# Patient Record
Sex: FEMALE | Race: WHITE | NOT HISPANIC OR LATINO | Employment: FULL TIME | URBAN - METROPOLITAN AREA
[De-identification: names, ages, dates, MRNs, and addresses within clinical notes are randomized per-mention and may not be internally consistent; named-entity substitution may affect disease eponyms.]

---
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
## 2021-05-26 ENCOUNTER — HISTORICAL (OUTPATIENT)
Dept: RADIOLOGY | Facility: HOSPITAL | Age: 21
End: 2021-05-26

## 2021-06-16 ENCOUNTER — HISTORICAL (OUTPATIENT)
Dept: ADMINISTRATIVE | Facility: HOSPITAL | Age: 21
End: 2021-06-16

## 2022-01-31 ENCOUNTER — HISTORICAL (OUTPATIENT)
Dept: ADMINISTRATIVE | Facility: HOSPITAL | Age: 22
End: 2022-01-31

## 2022-02-22 ENCOUNTER — HISTORICAL (OUTPATIENT)
Dept: ADMINISTRATIVE | Facility: HOSPITAL | Age: 22
End: 2022-02-22

## 2022-03-10 LAB
BASOPHILS # BLD AUTO: 0.04 10*3/UL (ref 0.01–0.08)
BASOPHILS NFR BLD AUTO: 0.3 % (ref 0.1–1.2)
EOSINOPHIL # BLD AUTO: 0.03 10*3/UL (ref 0.04–0.36)
EOSINOPHIL NFR BLD AUTO: 0.3 % (ref 0.7–7)
ERYTHROCYTE [DISTWIDTH] IN BLOOD BY AUTOMATED COUNT: 12.7 % (ref 11–14.5)
HCT VFR BLD AUTO: 33.6 % (ref 36–48)
HGB BLD-MCNC: 11.6 G/DL (ref 11.8–16)
IMM GRANULOCYTES # BLD AUTO: 0.16 10*3/UL (ref 0–0.03)
IMM GRANULOCYTES NFR BLD AUTO: 1.4 % (ref 0–0.5)
LYMPHOCYTES # BLD AUTO: 1.19 10*3/UL (ref 1.16–3.74)
LYMPHOCYTES NFR BLD AUTO: 10.2 % (ref 20–55)
MANUAL DIFF? (OHS): NORMAL
MCH RBC QN AUTO: 29.8 PG (ref 27–34)
MCHC RBC AUTO-ENTMCNC: 34.5 G/DL (ref 31–37)
MCV RBC AUTO: 86.4 FL (ref 79–99)
MONOCYTES # BLD AUTO: 0.56 10*3/UL (ref 0.24–0.36)
MONOCYTES NFR BLD AUTO: 4.8 % (ref 4.7–12.5)
NEUTROPHILS # BLD AUTO: 9.71 10*3/UL (ref 1.56–6.13)
NEUTROPHILS NFR BLD AUTO: 83 % (ref 37–73)
PLATELET # BLD AUTO: 229 10*3/UL (ref 140–371)
PMV BLD AUTO: 10 FL (ref 9.4–12.4)
RBC # BLD AUTO: 3.89 10*6/UL (ref 4–5.1)
WBC # SPEC AUTO: 11.7 10*3/UL (ref 4–11.5)

## 2022-04-07 ENCOUNTER — HISTORICAL (OUTPATIENT)
Dept: ADMINISTRATIVE | Facility: HOSPITAL | Age: 22
End: 2022-04-07
Payer: MEDICAID

## 2022-04-23 VITALS
HEIGHT: 64 IN | SYSTOLIC BLOOD PRESSURE: 130 MMHG | DIASTOLIC BLOOD PRESSURE: 86 MMHG | BODY MASS INDEX: 25.97 KG/M2 | WEIGHT: 152.13 LBS

## 2022-05-15 ENCOUNTER — HISTORICAL (OUTPATIENT)
Dept: ADMINISTRATIVE | Facility: HOSPITAL | Age: 22
End: 2022-05-15
Payer: MEDICAID

## 2023-05-11 ENCOUNTER — HOSPITAL ENCOUNTER (EMERGENCY)
Facility: HOSPITAL | Age: 23
Discharge: HOME | End: 2023-05-11
Attending: EMERGENCY MEDICINE
Payer: MEDICAID

## 2023-05-11 VITALS
RESPIRATION RATE: 18 BRPM | HEART RATE: 90 BPM | BODY MASS INDEX: 21.62 KG/M2 | OXYGEN SATURATION: 100 % | SYSTOLIC BLOOD PRESSURE: 137 MMHG | HEIGHT: 64 IN | TEMPERATURE: 98 F | WEIGHT: 126.63 LBS | DIASTOLIC BLOOD PRESSURE: 56 MMHG

## 2023-05-11 DIAGNOSIS — N61.0 MASTITIS: Primary | ICD-10-CM

## 2023-05-11 PROCEDURE — 99283 EMERGENCY DEPT VISIT LOW MDM: CPT

## 2023-05-11 RX ORDER — AMOXICILLIN AND CLAVULANATE POTASSIUM 875; 125 MG/1; MG/1
1 TABLET, FILM COATED ORAL 2 TIMES DAILY
Qty: 28 TABLET | Refills: 0 | Status: SHIPPED | OUTPATIENT
Start: 2023-05-11 | End: 2023-05-25

## 2023-05-11 RX ORDER — AMOXICILLIN AND CLAVULANATE POTASSIUM 875; 125 MG/1; MG/1
1 TABLET, FILM COATED ORAL 2 TIMES DAILY
Qty: 28 TABLET | Refills: 0 | Status: SHIPPED | OUTPATIENT
Start: 2023-05-11 | End: 2023-05-11 | Stop reason: SDUPTHER

## 2023-05-11 NOTE — ED PROVIDER NOTES
Encounter Date: 5/11/2023       History     Chief Complaint   Patient presents with    Breast Pain     C/o left breast redness and pain. Reports breastfeeding     See MDM    The history is provided by the patient. No  was used.   Review of patient's allergies indicates:  No Known Allergies  History reviewed. No pertinent past medical history.  Past Surgical History:   Procedure Laterality Date    CHOLECYSTECTOMY      EYE SURGERY       History reviewed. No pertinent family history.  Social History     Tobacco Use    Smoking status: Never    Smokeless tobacco: Never   Substance Use Topics    Alcohol use: Not Currently    Drug use: Not Currently     Review of Systems   Constitutional:  Negative for fever.   Respiratory:  Negative for cough and shortness of breath.    Cardiovascular:  Negative for chest pain.   Gastrointestinal:  Negative for abdominal pain.   Genitourinary:  Negative for difficulty urinating and dysuria.   Musculoskeletal:  Negative for gait problem.   Skin:  Negative for color change.   Neurological:  Negative for dizziness, speech difficulty and headaches.   Psychiatric/Behavioral:  Negative for hallucinations and suicidal ideas.    All other systems reviewed and are negative.    Physical Exam     Initial Vitals [05/11/23 1723]   BP Pulse Resp Temp SpO2   (!) 137/56 90 18 98.2 °F (36.8 °C) 100 %      MAP       --         Physical Exam    Nursing note and vitals reviewed.  Constitutional: She appears well-developed and well-nourished.   HENT:   Head: Normocephalic.   Eyes: EOM are normal.   Neck:   Normal range of motion.  Cardiovascular:  Normal rate, regular rhythm, normal heart sounds and intact distal pulses.           Pulmonary/Chest: Breath sounds normal. No respiratory distress.   Chaperone Pat:  mastitis left breast   Abdominal: Abdomen is soft. Bowel sounds are normal. There is no abdominal tenderness.   Musculoskeletal:         General: Normal range of motion.       Cervical back: Normal range of motion.     Neurological: She is alert and oriented to person, place, and time. She has normal strength.   Skin: Skin is warm and dry.   Psychiatric: She has a normal mood and affect. Her behavior is normal. Judgment and thought content normal.       ED Course   Procedures  Labs Reviewed - No data to display       Imaging Results    None          Medications - No data to display  Medical Decision Making:   Initial Assessment:   Historian:   Patient.  Patient is a 23-year-old female  that presents with redness to left breast that has been present few days. Associated symptoms nothing. Surrounding information is currently breast feeds at times. Exacerbated by nothing. Relieved by nothing. Patient treatment prior to arrival none. Risk factors include nothing. Other history pertaining to this complaint none.   Assessment:  See physical exam.    Differential Diagnosis:    Breast abscess, breast cellulitis, mastitis  ED Management:   History was obtained.  Physical performed.  Examination of breasts shows mastitis.  I will put patient Augmentin for 14 days.  No medical or surgical consult indicated ER.  No social determinants that affect her care were noted.                        Clinical Impression:   Final diagnoses:  [N61.0] Mastitis - left breast (Primary)        ED Disposition Condition    Discharge Stable          ED Prescriptions       Medication Sig Dispense Start Date End Date Auth. Provider    amoxicillin-clavulanate 875-125mg (AUGMENTIN) 875-125 mg per tablet Take 1 tablet by mouth 2 (two) times daily. for 14 days 28 tablet 5/11/2023 5/25/2023 JA Avendaño          Follow-up Information       Follow up With Specialties Details Why Contact Info    Your Primary Care Provider  Call in 3 days ed follow up              JA Avendaño  05/11/23 3751

## 2023-08-02 NOTE — PROGRESS NOTES
Chief Complaint: Annual exam    Chief Complaint   Patient presents with    Well Woman       HPI:   Marivel Knight is a 23 y.o. year old  here for her Annual Exam. Currently still breastfeeding. Doing well, denies complaints.     GYN History:  LMP: none  Frequency: n/a   Cycle Length: n/a days   Flow: n/a  Intermenstrual Bleeding: No  Postcoital Bleeding: No  Dysmenorrhea: No  Sexually Active: yes   Dyspareunia: No  Contraception: none   H/o STI: TV  Last pap:   H/o abnl pap prior to current: Yes   Colposcopy: no  Gardasil: 2/2   MMG: none  H/o abnl MMG: none  Colonoscopy: n/a        History reviewed. No pertinent past medical history.  Past Surgical History:   Procedure Laterality Date    CHOLECYSTECTOMY      EYE SURGERY       No current outpatient medications on file.  Review of patient's allergies indicates:  No Known Allergies  OB History    Para Term  AB Living   1 1 1     1   SAB IAB Ectopic Multiple Live Births           1      # Outcome Date GA Lbr Warren/2nd Weight Sex Delivery Anes PTL Lv   1 Term 23 38w1d  2.863 kg (6 lb 5 oz)  Vag-Spont EPI  HAKEEM     Social History     Tobacco Use    Smoking status: Never    Smokeless tobacco: Never   Substance Use Topics    Alcohol use: Not Currently    Drug use: Not Currently     History reviewed. No pertinent family history.    Review of Systems:   Review of Systems   Constitutional:  Negative for appetite change, chills, fatigue, fever and unexpected weight change.   Eyes:  Negative for visual disturbance.   Respiratory:  Negative for cough, shortness of breath and wheezing.    Cardiovascular:  Negative for chest pain, palpitations and leg swelling.   Gastrointestinal:  Negative for abdominal pain, bloating, blood in stool, constipation, diarrhea, nausea, vomiting, reflux and fecal incontinence.   Endocrine: Negative for hair loss and hot flashes.   Genitourinary:  Negative for bladder incontinence, decreased libido, dysmenorrhea,  "dyspareunia, dysuria, flank pain, frequency, genital sores, hematuria, hot flashes, menorrhagia, menstrual problem, pelvic pain, urgency, vaginal bleeding, vaginal discharge, vaginal pain, urinary incontinence, postcoital bleeding, postmenopausal bleeding, vaginal dryness and vaginal odor.   Integumentary:  Negative for rash, acne, hair changes, breast mass, nipple discharge, breast skin changes and breast tenderness.   Neurological:  Negative for headaches.   Psychiatric/Behavioral:  Negative for depression.    Breast: Negative for asymmetry, breast self exam, lump, mass, mastodynia, nipple discharge, skin changes and tenderness       Physical Exam:  /60 (BP Location: Left arm, Patient Position: Sitting)   Ht 5' 4" (1.626 m)   Wt 56.7 kg (125 lb)   BMI 21.46 kg/m²       Physical Exam:   Constitutional: She is oriented to person, place, and time. She appears well-developed and well-nourished.    HENT:   Head: Normocephalic.      Cardiovascular:       Exam reveals no edema.        Pulmonary/Chest: Effort normal. She exhibits no mass, no tenderness, no bony tenderness, no deformity and no retraction. Right breast exhibits no inverted nipple, no mass, no nipple discharge, no skin change, no tenderness, no bleeding, no swelling, no mastectomy, no augmentation and no lumpectomy. Left breast exhibits no inverted nipple, no mass, no nipple discharge, no skin change, no tenderness, no bleeding, no swelling, no mastectomy, no augmentation and no lumpectomy. Breasts are symmetrical.        Abdominal: Soft. She exhibits no distension and no mass. There is no abdominal tenderness. There is no rebound and no guarding. No hernia. Hernia confirmed negative in the right inguinal area.     Genitourinary:    Inguinal canal, vagina, uterus, right adnexa, left adnexa and rectum normal.   Rectum:      No anal fissure or external hemorrhoid.   The external female genitalia was normal.   No external genitalia lesions " identified,Genitalia hair distrobution normal .   Labial bartholins normal.There is no rash, tenderness, lesion or injury on the right labia. There is no rash, tenderness, lesion or injury on the left labia. Cervix is normal. No no masses or organomegaly. Right adnexum displays no mass, no tenderness and no fullness. Left adnexum displays no mass, no tenderness and no fullness. Vagina exhibits no lesion. No erythema,  no vaginal discharge, tenderness, bleeding, rectocele, cystocele or unspecified prolapse of vaginal walls in the vagina.    No foreign body in the vagina.      No signs of injury in the vagina.   Vagina was moist.Cervix exhibits no motion tenderness, no lesion, no discharge, no friability, no lesion, no tenderness and no polyp. Uterus consistancy normal. and Uerus contour normal  Uterus is not deviated, not enlarged, not fixed, not tender, not hosting fibroids and no uterine prolapse. Normal urethral meatus.Urethral Meatus exhibits: urethral lesion and prolapsedUrethra findings: no urethral mass and no tendernessBladder findings: no bladder tenderness          Musculoskeletal: Normal range of motion.      Lymphadenopathy: No inguinal adenopathy noted on the right or left side.    Neurological: She is alert and oriented to person, place, and time.    Skin: Skin is warm and dry.    Psychiatric: She has a normal mood and affect. Her behavior is normal. Judgment and thought content normal.        Assessment:   Annual Well Women Exam  There are no diagnoses linked to this encounter.      Plan:  Pap Done GC CZ TV   Breast Self-awareness  Recommend exercise at least 3 times weekly  Healthy, balanced diet  Keep yearly follow up with PCP  Follow up for PRN/ANNUAL .     Counseling:    A brief discussion of contraceptive choices and STD prevention was had.    Avoidance of cigarette smoking, alcohol use, and drug use was encouraged.    A healthy diet and regular exercise was stressed.    All questions were  answered and the patient voiced understanding of the above issues.

## 2023-08-04 ENCOUNTER — OFFICE VISIT (OUTPATIENT)
Dept: OBSTETRICS AND GYNECOLOGY | Facility: CLINIC | Age: 23
End: 2023-08-04
Payer: MEDICAID

## 2023-08-04 VITALS
BODY MASS INDEX: 21.34 KG/M2 | HEIGHT: 64 IN | WEIGHT: 125 LBS | SYSTOLIC BLOOD PRESSURE: 120 MMHG | DIASTOLIC BLOOD PRESSURE: 60 MMHG

## 2023-08-04 DIAGNOSIS — Z01.419 WELL WOMAN EXAM WITH ROUTINE GYNECOLOGICAL EXAM: Primary | ICD-10-CM

## 2023-08-04 DIAGNOSIS — Z12.4 SCREENING FOR MALIGNANT NEOPLASM OF THE CERVIX: ICD-10-CM

## 2023-08-04 PROCEDURE — 1159F PR MEDICATION LIST DOCUMENTED IN MEDICAL RECORD: ICD-10-PCS | Mod: CPTII,,, | Performed by: OBSTETRICS & GYNECOLOGY

## 2023-08-04 PROCEDURE — 87491 CHLMYD TRACH DNA AMP PROBE: CPT

## 2023-08-04 PROCEDURE — 88174 CYTOPATH C/V AUTO IN FLUID: CPT | Performed by: OBSTETRICS & GYNECOLOGY

## 2023-08-04 PROCEDURE — 3074F PR MOST RECENT SYSTOLIC BLOOD PRESSURE < 130 MM HG: ICD-10-PCS | Mod: CPTII,,, | Performed by: OBSTETRICS & GYNECOLOGY

## 2023-08-04 PROCEDURE — 3078F DIAST BP <80 MM HG: CPT | Mod: CPTII,,, | Performed by: OBSTETRICS & GYNECOLOGY

## 2023-08-04 PROCEDURE — 3074F SYST BP LT 130 MM HG: CPT | Mod: CPTII,,, | Performed by: OBSTETRICS & GYNECOLOGY

## 2023-08-04 PROCEDURE — 3008F PR BODY MASS INDEX (BMI) DOCUMENTED: ICD-10-PCS | Mod: CPTII,,, | Performed by: OBSTETRICS & GYNECOLOGY

## 2023-08-04 PROCEDURE — 99395 PREV VISIT EST AGE 18-39: CPT | Mod: ,,, | Performed by: OBSTETRICS & GYNECOLOGY

## 2023-08-04 PROCEDURE — 1159F MED LIST DOCD IN RCRD: CPT | Mod: CPTII,,, | Performed by: OBSTETRICS & GYNECOLOGY

## 2023-08-04 PROCEDURE — 99395 PR PREVENTIVE VISIT,EST,18-39: ICD-10-PCS | Mod: ,,, | Performed by: OBSTETRICS & GYNECOLOGY

## 2023-08-04 PROCEDURE — 87591 N.GONORRHOEAE DNA AMP PROB: CPT

## 2023-08-04 PROCEDURE — 87661 TRICHOMONAS VAGINALIS AMPLIF: CPT

## 2023-08-04 PROCEDURE — 3078F PR MOST RECENT DIASTOLIC BLOOD PRESSURE < 80 MM HG: ICD-10-PCS | Mod: CPTII,,, | Performed by: OBSTETRICS & GYNECOLOGY

## 2023-08-04 PROCEDURE — 3008F BODY MASS INDEX DOCD: CPT | Mod: CPTII,,, | Performed by: OBSTETRICS & GYNECOLOGY

## 2023-08-09 LAB — PSYCHE PATHOLOGY RESULT: NORMAL

## 2024-03-19 DIAGNOSIS — O20.9 BLEEDING IN EARLY PREGNANCY: Primary | ICD-10-CM

## 2024-03-19 LAB — B-HCG SERPL-ACNC: 36 MIU/ML

## 2024-03-21 ENCOUNTER — TELEPHONE (OUTPATIENT)
Dept: OBSTETRICS AND GYNECOLOGY | Facility: CLINIC | Age: 24
End: 2024-03-21
Payer: COMMERCIAL

## 2024-03-21 NOTE — TELEPHONE ENCOUNTER
Spoke with pt and she is miscarriage and she is feeling ok, pt will come see him in 2 weeks to follow up        ----- Message from Ruben Olmstead sent at 3/21/2024  8:19 AM CDT -----  Contact: self  Patient Marivel Knight is requesting a call back regarding miscarriage she had - has a few questions. Please call back at [phone].

## 2024-09-24 ENCOUNTER — PATIENT MESSAGE (OUTPATIENT)
Dept: PRIMARY CARE CLINIC | Facility: CLINIC | Age: 24
End: 2024-09-24
Payer: COMMERCIAL

## 2024-10-08 DIAGNOSIS — Z34.91 ENCOUNTER FOR PREGNANCY RELATED EXAMINATION IN FIRST TRIMESTER: Primary | ICD-10-CM

## 2024-10-15 ENCOUNTER — PROCEDURE VISIT (OUTPATIENT)
Dept: OBSTETRICS AND GYNECOLOGY | Facility: CLINIC | Age: 24
End: 2024-10-15
Payer: COMMERCIAL

## 2024-10-15 DIAGNOSIS — Z34.91 ENCOUNTER FOR PREGNANCY RELATED EXAMINATION IN FIRST TRIMESTER: ICD-10-CM

## 2024-10-15 PROCEDURE — 76801 OB US < 14 WKS SINGLE FETUS: CPT | Mod: 26,S$PBB,, | Performed by: OBSTETRICS & GYNECOLOGY

## 2024-10-31 ENCOUNTER — INITIAL PRENATAL (OUTPATIENT)
Dept: OBSTETRICS AND GYNECOLOGY | Facility: CLINIC | Age: 24
End: 2024-10-31
Payer: COMMERCIAL

## 2024-10-31 VITALS
WEIGHT: 140 LBS | DIASTOLIC BLOOD PRESSURE: 73 MMHG | SYSTOLIC BLOOD PRESSURE: 122 MMHG | HEART RATE: 84 BPM | BODY MASS INDEX: 24.03 KG/M2

## 2024-10-31 DIAGNOSIS — Z34.82 ENCOUNTER FOR SUPERVISION OF NORMAL PREGNANCY IN MULTIGRAVIDA IN SECOND TRIMESTER: Primary | ICD-10-CM

## 2024-10-31 PROCEDURE — 0500F INITIAL PRENATAL CARE VISIT: CPT | Mod: CPTII,,, | Performed by: OBSTETRICS & GYNECOLOGY

## 2024-11-01 ENCOUNTER — PATIENT MESSAGE (OUTPATIENT)
Dept: OBSTETRICS AND GYNECOLOGY | Facility: CLINIC | Age: 24
End: 2024-11-01
Payer: COMMERCIAL

## 2024-11-01 LAB
CHLAMYDIA: POSITIVE
GONORRHEA: NEGATIVE
SOURCE: ABNORMAL
SOURCE: NORMAL
TRICHOMONAS AMPLIFIED: NEGATIVE

## 2024-11-04 ENCOUNTER — PATIENT MESSAGE (OUTPATIENT)
Dept: OBSTETRICS AND GYNECOLOGY | Facility: CLINIC | Age: 24
End: 2024-11-04
Payer: COMMERCIAL

## 2024-11-04 DIAGNOSIS — A74.9 CHLAMYDIA INFECTION: Primary | ICD-10-CM

## 2024-11-04 RX ORDER — AZITHROMYCIN 500 MG/1
1000 TABLET, FILM COATED ORAL DAILY
Qty: 2 TABLET | Refills: 0 | Status: SHIPPED | OUTPATIENT
Start: 2024-11-04 | End: 2024-11-05

## 2024-12-05 ENCOUNTER — ROUTINE PRENATAL (OUTPATIENT)
Dept: OBSTETRICS AND GYNECOLOGY | Facility: CLINIC | Age: 24
End: 2024-12-05
Payer: COMMERCIAL

## 2024-12-05 VITALS — HEART RATE: 108 BPM | WEIGHT: 146 LBS | BODY MASS INDEX: 25.06 KG/M2

## 2024-12-05 DIAGNOSIS — Z34.82 ENCOUNTER FOR SUPERVISION OF NORMAL PREGNANCY IN MULTIGRAVIDA IN SECOND TRIMESTER: Primary | ICD-10-CM

## 2024-12-05 DIAGNOSIS — A74.9 CHLAMYDIA INFECTION: ICD-10-CM

## 2024-12-05 PROCEDURE — 0502F SUBSEQUENT PRENATAL CARE: CPT | Mod: CPTII,,, | Performed by: OBSTETRICS & GYNECOLOGY

## 2024-12-05 NOTE — PROGRESS NOTES
Subjective:       Patient ID: Marivel Knight is a 24 y.o.  at 15w0d      Chief Complaint:  Routine Prenatal Visit      History of Present Illness  No complaints. Reports normal sx. Labs and history reviewed with pt.       Review of Systems  Denies n/v, f/c, dysuria, contractions,   VD, VB, round ligament pain, headaches       Objective:     Vitals:    24 0737   Pulse: 108     Wt Readings from Last 3 Encounters:   24 66.2 kg (146 lb)   10/31/24 63.5 kg (140 lb)   23 56.7 kg (125 lb)        nad  NCAT  pupils normal size  Skin nml no rashes or lesions  No resp distress, resp even and unlabored  Gravid nt, no rebound no guarding  No cyanosis or clubbing, edema appropriate for pregn    FHT: 150's  Subjective:       Patient ID: Marivel Knight is a 24 y.o.  at 15w0d      Chief Complaint:  Routine Prenatal Visit      History of Present Illness  No complaints. Reports normal sx. Labs and history reviewed with pt.       Review of Systems  Denies n/v, f/c, dysuria, contractions,   VD, VB, round ligament pain, headaches       Objective:     Vitals:    24 0737   Pulse: 108     Wt Readings from Last 3 Encounters:   24 66.2 kg (146 lb)   10/31/24 63.5 kg (140 lb)   23 56.7 kg (125 lb)        nad  NCAT  pupils normal size  Skin nml no rashes or lesions  No resp distress, resp even and unlabored  Gravid nt, no rebound no guarding  No cyanosis or clubbing, edema appropriate for pregn    FHT: 150's       Assessment:        1. Encounter for supervision of normal pregnancy in multigravida in second trimester    2. Chlamydia infection                Plan:     Encouraged PNV  Pain, fever, bleeding precautions   RTC 4 weeks             Assessment:        1. Encounter for supervision of normal pregnancy in multigravida in second trimester    2. Chlamydia infection                Plan:     Encouraged PNV  Pain, fever, bleeding precautions   RTC 4 weeks

## 2024-12-06 LAB
CHLAMYDIA: NEGATIVE
GONORRHEA: NEGATIVE
SOURCE: NORMAL
SOURCE: NORMAL
TRICHOMONAS AMPLIFIED: NEGATIVE

## 2024-12-12 ENCOUNTER — PATIENT MESSAGE (OUTPATIENT)
Dept: OTHER | Facility: OTHER | Age: 24
End: 2024-12-12
Payer: COMMERCIAL

## 2024-12-19 ENCOUNTER — PATIENT MESSAGE (OUTPATIENT)
Dept: OTHER | Facility: OTHER | Age: 24
End: 2024-12-19
Payer: COMMERCIAL

## 2024-12-30 DIAGNOSIS — Z34.91 ENCOUNTER FOR PREGNANCY RELATED EXAMINATION IN FIRST TRIMESTER: Primary | ICD-10-CM

## 2024-12-30 DIAGNOSIS — Z34.92 ENCOUNTER FOR PREGNANCY RELATED EXAMINATION IN SECOND TRIMESTER: ICD-10-CM

## 2025-01-07 ENCOUNTER — ROUTINE PRENATAL (OUTPATIENT)
Dept: OBSTETRICS AND GYNECOLOGY | Facility: CLINIC | Age: 25
End: 2025-01-07
Payer: COMMERCIAL

## 2025-01-07 ENCOUNTER — PROCEDURE VISIT (OUTPATIENT)
Dept: OBSTETRICS AND GYNECOLOGY | Facility: CLINIC | Age: 25
End: 2025-01-07
Payer: COMMERCIAL

## 2025-01-07 VITALS
SYSTOLIC BLOOD PRESSURE: 131 MMHG | HEART RATE: 91 BPM | DIASTOLIC BLOOD PRESSURE: 84 MMHG | BODY MASS INDEX: 26.09 KG/M2 | WEIGHT: 152 LBS

## 2025-01-07 DIAGNOSIS — Z34.92 ENCOUNTER FOR PREGNANCY RELATED EXAMINATION IN SECOND TRIMESTER: ICD-10-CM

## 2025-01-07 DIAGNOSIS — Z34.82 ENCOUNTER FOR SUPERVISION OF NORMAL PREGNANCY IN MULTIGRAVIDA IN SECOND TRIMESTER: Primary | ICD-10-CM

## 2025-01-07 PROCEDURE — 76805 OB US >/= 14 WKS SNGL FETUS: CPT | Mod: 26,S$PBB,, | Performed by: OBSTETRICS & GYNECOLOGY

## 2025-01-07 PROCEDURE — 0502F SUBSEQUENT PRENATAL CARE: CPT | Mod: S$PBB,,, | Performed by: OBSTETRICS & GYNECOLOGY

## 2025-01-07 NOTE — PROGRESS NOTES
Subjective:       Patient ID: Marivel Knight is a 25 y.o.  at 19w5d      Chief Complaint:  Routine Prenatal Visit      History of Present Illness  No complaints. Reports normal sx. Labs and history reviewed with pt.       Review of Systems  Denies n/v, f/c, dysuria, contractions,   VD, VB, round ligament pain, headaches       Objective:   There were no vitals filed for this visit.  Wt Readings from Last 3 Encounters:   24 66.2 kg (146 lb)   10/31/24 63.5 kg (140 lb)   23 56.7 kg (125 lb)        nad  NCAT  pupils normal size  Skin nml no rashes or lesions  No resp distress, resp even and unlabored  Gravid nt, no rebound no guarding  No cyanosis or clubbing, edema appropriate for pregn    FHT: 150's       Assessment:        1. Encounter for supervision of normal pregnancy in multigravida in second trimester                Plan:      Encouraged PNV  Pain, fever, bleeding precautions   RTC 4 weeks

## 2025-01-09 ENCOUNTER — PATIENT MESSAGE (OUTPATIENT)
Dept: OTHER | Facility: OTHER | Age: 25
End: 2025-01-09
Payer: MEDICAID

## 2025-01-29 DIAGNOSIS — Z36.2 ENCOUNTER FOR FOLLOW-UP ULTRASOUND OF FETAL ANATOMY: Primary | ICD-10-CM

## 2025-02-04 ENCOUNTER — PROCEDURE VISIT (OUTPATIENT)
Dept: OBSTETRICS AND GYNECOLOGY | Facility: CLINIC | Age: 25
End: 2025-02-04
Payer: MEDICAID

## 2025-02-04 ENCOUNTER — ROUTINE PRENATAL (OUTPATIENT)
Dept: OBSTETRICS AND GYNECOLOGY | Facility: CLINIC | Age: 25
End: 2025-02-04
Payer: MEDICAID

## 2025-02-04 VITALS — DIASTOLIC BLOOD PRESSURE: 71 MMHG | WEIGHT: 160 LBS | SYSTOLIC BLOOD PRESSURE: 123 MMHG | BODY MASS INDEX: 27.46 KG/M2

## 2025-02-04 DIAGNOSIS — Z36.2 ENCOUNTER FOR FOLLOW-UP ULTRASOUND OF FETAL ANATOMY: ICD-10-CM

## 2025-02-04 DIAGNOSIS — Z34.82 ENCOUNTER FOR SUPERVISION OF NORMAL PREGNANCY IN MULTIGRAVIDA IN SECOND TRIMESTER: Primary | ICD-10-CM

## 2025-02-04 PROCEDURE — 76816 OB US FOLLOW-UP PER FETUS: CPT | Mod: 26,S$PBB,, | Performed by: OBSTETRICS & GYNECOLOGY

## 2025-02-04 PROCEDURE — 99213 OFFICE O/P EST LOW 20 MIN: CPT | Mod: TH,S$PBB,, | Performed by: NURSE PRACTITIONER

## 2025-02-04 RX ORDER — FERROUS SULFATE 325(65) MG
325 TABLET ORAL DAILY
COMMUNITY
Start: 2022-07-14

## 2025-02-04 RX ORDER — NAPROXEN SODIUM 220 MG/1
81 TABLET, FILM COATED ORAL DAILY
COMMUNITY

## 2025-02-04 NOTE — PROGRESS NOTES
Subjective:       Patient ID: Marivel Knight is a 25 y.o.  at 23w5d      Chief Complaint:  Routine Prenatal Visit      History of Present Illness  No complaints. Reports normal fetal movement. Labs and history reviewed with pt.       Review of Systems  Denies n/v, f/c, dysuria, contractions,   VD, VB, round ligament pain, headaches      OB History          2    Para   1    Term   1            AB        Living   1         SAB        IAB        Ectopic        Multiple        Live Births   1                   Objective:     Vitals:    25 0902   BP: 123/71     Wt Readings from Last 3 Encounters:   25 72.6 kg (160 lb)   25 68.9 kg (152 lb)   24 66.2 kg (146 lb)        nad  NCAT  pupils normal size  Skin nml no rashes or lesions  No resp distress, resp even and unlabored  Gravid nt, no rebound no guarding  No cyanosis or clubbing, edema appropriate for pregn  FH AGA  FHT: 144 by u/s         Assessment:        1. Encounter for supervision of normal pregnancy in multigravida in second trimester                Plan:        Encounter for supervision of normal pregnancy in multigravida in second trimester  -     Glucose, 1 Hr Post 50 GM; Future; Expected date: 2025         Anatomy confirmed   Encouraged PNV  Pain, fever, bleeding precautions   Follow up in about 4 weeks (around 3/4/2025).

## 2025-02-06 ENCOUNTER — PATIENT MESSAGE (OUTPATIENT)
Dept: OTHER | Facility: OTHER | Age: 25
End: 2025-02-06
Payer: MEDICAID

## 2025-02-07 LAB — GLUCOSE 1 HR POST 50 GM: 73 MG/DL (ref 70–130)

## 2025-02-20 ENCOUNTER — PATIENT MESSAGE (OUTPATIENT)
Dept: OTHER | Facility: OTHER | Age: 25
End: 2025-02-20
Payer: MEDICAID

## 2025-03-05 ENCOUNTER — ROUTINE PRENATAL (OUTPATIENT)
Dept: OBSTETRICS AND GYNECOLOGY | Facility: CLINIC | Age: 25
End: 2025-03-05
Payer: MEDICAID

## 2025-03-05 ENCOUNTER — RESULTS FOLLOW-UP (OUTPATIENT)
Dept: OBSTETRICS AND GYNECOLOGY | Facility: CLINIC | Age: 25
End: 2025-03-05

## 2025-03-05 VITALS — BODY MASS INDEX: 28.84 KG/M2 | SYSTOLIC BLOOD PRESSURE: 123 MMHG | DIASTOLIC BLOOD PRESSURE: 71 MMHG | WEIGHT: 168 LBS

## 2025-03-05 DIAGNOSIS — Z34.82 ENCOUNTER FOR SUPERVISION OF NORMAL PREGNANCY IN MULTIGRAVIDA IN SECOND TRIMESTER: Primary | ICD-10-CM

## 2025-03-05 LAB
ABS NRBC COUNT: 0 X 10 3/UL (ref 0–0.01)
ABSOLUTE BASOPHIL: 0.04 X 10 3/UL (ref 0–0.22)
ABSOLUTE EOSINOPHIL: 0.03 X 10 3/UL (ref 0.04–0.54)
ABSOLUTE IMMATURE GRAN: 0.13 X 10 3/UL (ref 0–0.04)
ABSOLUTE LYMPHOCYTE: 1.43 X 10 3/UL (ref 0.86–4.75)
ABSOLUTE MONOCYTE: 0.42 X 10 3/UL (ref 0.22–1.08)
BASOPHILS NFR BLD: 0.4 % (ref 0.2–1.2)
EOSINOPHIL NFR BLD: 0.3 % (ref 0.7–7)
HCT VFR BLD AUTO: 37.1 % (ref 37–47)
HGB BLD-MCNC: 12.6 G/DL (ref 12–16)
IMMATURE GRANULOCYTES: 1.2 % (ref 0–0.5)
LYMPHOCYTES NFR BLD: 13.6 % (ref 19.3–53.1)
MCH RBC QN AUTO: 30.5 PG (ref 27–32)
MCHC RBC AUTO-ENTMCNC: 34 G/DL (ref 32–36)
MCV RBC AUTO: 89.8 FL (ref 82–100)
MONOCYTES NFR BLD: 4 % (ref 4.7–12.5)
NEUTROPHILS # BLD AUTO: 8.49 X 10 3/UL (ref 2.15–7.56)
NEUTROPHILS NFR BLD: 80.5 % (ref 34–71.1)
NUCLEATED RED BLOOD CELLS: 0 /100 WBC (ref 0–0.2)
PLATELET # BLD AUTO: 239 X 10 3/UL (ref 135–400)
RBC # BLD AUTO: 4.13 X 10 6/UL (ref 4.2–5.4)
RDW-SD: 41.4 FL (ref 37–54)
WBC # BLD: 10.54 X 10 3/UL (ref 4.3–10.8)

## 2025-03-05 PROCEDURE — 99214 OFFICE O/P EST MOD 30 MIN: CPT | Mod: TH,S$PBB,, | Performed by: NURSE PRACTITIONER

## 2025-03-05 NOTE — PROGRESS NOTES
Subjective:       Patient ID: Marivel Knight is a 25 y.o.  at 27w6d      Chief Complaint:  Routine Prenatal Visit      History of Present Illness  Reports feeling a little faint at work the other day, but ssx resolved with rest. Reports normal fetal movement. Labs and history reviewed with pt.       Review of Systems  Denies n/v, f/c, dysuria, contractions,   VD, VB, round ligament pain, headaches      OB History          2    Para   1    Term   1            AB        Living   1         SAB        IAB        Ectopic        Multiple        Live Births   1                   Objective:     Vitals:    25 1318   BP: 123/71     Wt Readings from Last 3 Encounters:   25 76.2 kg (168 lb)   25 72.6 kg (160 lb)   25 68.9 kg (152 lb)        nad  NCAT  pupils normal size  Skin nml no rashes or lesions  No resp distress, resp even and unlabored  Gravid nt, no rebound no guarding  No cyanosis or clubbing, edema appropriate for pregn  FH AGA  FHT: 140s       Assessment:        1. Encounter for supervision of normal pregnancy in multigravida in second trimester                Plan:        Encounter for supervision of normal pregnancy in multigravida in second trimester  -     CBC Auto Differential; Future; Expected date: 2025         Small frequent meals, increase hydration, and change positions slowly   Encouraged PNV  Pain, fever, bleeding precautions   Follow up in about 2 weeks (around 3/19/2025).

## 2025-03-06 ENCOUNTER — PATIENT MESSAGE (OUTPATIENT)
Dept: OTHER | Facility: OTHER | Age: 25
End: 2025-03-06
Payer: MEDICAID

## 2025-03-18 ENCOUNTER — ROUTINE PRENATAL (OUTPATIENT)
Dept: OBSTETRICS AND GYNECOLOGY | Facility: CLINIC | Age: 25
End: 2025-03-18
Payer: MEDICAID

## 2025-03-18 VITALS
HEART RATE: 89 BPM | WEIGHT: 170.5 LBS | DIASTOLIC BLOOD PRESSURE: 72 MMHG | SYSTOLIC BLOOD PRESSURE: 120 MMHG | BODY MASS INDEX: 29.27 KG/M2

## 2025-03-18 DIAGNOSIS — Z34.83 ENCOUNTER FOR SUPERVISION OF NORMAL PREGNANCY IN MULTIGRAVIDA IN THIRD TRIMESTER: Primary | ICD-10-CM

## 2025-03-18 PROCEDURE — 99214 OFFICE O/P EST MOD 30 MIN: CPT | Mod: TH,S$PBB,, | Performed by: NURSE PRACTITIONER

## 2025-03-18 NOTE — PROGRESS NOTES
Subjective:       Patient ID: Marivel Knight is a 25 y.o.  at 29w5d      Chief Complaint:  Routine Prenatal Visit      History of Present Illness  No complaints. Reports normal fetal movement. Labs and history reviewed with pt.       Review of Systems  Denies n/v, f/c, dysuria, contractions,   VD, VB, round ligament pain, headaches      OB History          2    Para   1    Term   1            AB        Living   1         SAB        IAB        Ectopic        Multiple        Live Births   1                   Objective:     Vitals:    25 0836   BP: 120/72   Pulse: 89     Wt Readings from Last 3 Encounters:   25 77.3 kg (170 lb 8 oz)   25 76.2 kg (168 lb)   25 72.6 kg (160 lb)        nad  NCAT  pupils normal size  Skin nml no rashes or lesions  No resp distress, resp even and unlabored  Gravid nt, no rebound no guarding  No cyanosis or clubbing, edema appropriate for pregn  FH AGA  FHT: 140s       Assessment:        1. Encounter for supervision of normal pregnancy in multigravida in third trimester                Plan:        Encounter for supervision of normal pregnancy in multigravida in third trimester  -     Tdap (BOOSTRIX) vaccine injection 0.5 mL       Encouraged PNV  Pain, fever, bleeding precautions   Follow up in about 2 weeks (around 2025).

## 2025-03-20 ENCOUNTER — PATIENT MESSAGE (OUTPATIENT)
Dept: OTHER | Facility: OTHER | Age: 25
End: 2025-03-20
Payer: MEDICAID

## 2025-04-01 ENCOUNTER — ROUTINE PRENATAL (OUTPATIENT)
Dept: OBSTETRICS AND GYNECOLOGY | Facility: CLINIC | Age: 25
End: 2025-04-01
Payer: MEDICAID

## 2025-04-01 ENCOUNTER — PATIENT MESSAGE (OUTPATIENT)
Dept: OBSTETRICS AND GYNECOLOGY | Facility: CLINIC | Age: 25
End: 2025-04-01

## 2025-04-01 VITALS
SYSTOLIC BLOOD PRESSURE: 137 MMHG | DIASTOLIC BLOOD PRESSURE: 81 MMHG | BODY MASS INDEX: 29.93 KG/M2 | HEART RATE: 78 BPM | WEIGHT: 174.38 LBS

## 2025-04-01 DIAGNOSIS — Z34.03 ENCOUNTER FOR SUPERVISION OF NORMAL FIRST PREGNANCY IN THIRD TRIMESTER: Primary | ICD-10-CM

## 2025-04-01 PROCEDURE — 99214 OFFICE O/P EST MOD 30 MIN: CPT | Mod: TH,S$PBB,, | Performed by: NURSE PRACTITIONER

## 2025-04-01 NOTE — LETTER
April 1, 2025      [facility] - OB GYN  [address]  [place] [address]  Phone: [phone]  Fax: [phone]       Patient: Marivel Knight   YOB: 2000  Date of Visit: 04/01/2025    To Whom It May Concern:    Dwain Knight  was at [facility] on 04/01/2025. The patient may return to work/school on 4/2/25  with restrictions of no lifting, pulling, pushing over 50 pounds.. If you have any questions or concerns, or if I can be of further assistance, please do not hesitate to contact me.    Sincerely,        Kaylie Vickers, NP

## 2025-04-01 NOTE — PROGRESS NOTES
CC: Follow-Up OB    HPI:   25 y.o.  at 31w5d with EDC of 2025, by Ultrasound, here for her follow up OB visit.  Denies any complaints.Seen @ [facility] over the weekend for contx and abdominal pain when she is too active at work.     Pregnancy ROS:  Positive fetal movement   Negative leakage of fluid   Negative vaginal bleeding   Negative headache, vision changes, RUQ pain, epigastric pain  Negative contractions/abdominal pain   Negative pelvic pressure     Physical Exam:  Prenatal Vitals  BP: 137/81  Weight: 79.1 kg (174 lb 6 oz)  Fetal Heart Rate: 130s  Urine Glucose: Negative  Urine Albumin: Negative    Wt Readings from Last 3 Encounters:   25 79.1 kg (174 lb 6 oz)   25 77.3 kg (170 lb 8 oz)   25 76.2 kg (168 lb)       Body mass index is 29.93 kg/m².    General: NAD, well developed, well nourished  Psych: alert and oriented to person, time and place, normal affect  HEENT: normocephalic, atraumatic  Abd: Gravid, soft, NT, ND  Skin: warm, dry  Neuro: normal gait, gross motor function intact  No cyanosis, clubbing or edema    FHTs: 130s  FH: AGA    Maternal Blood Type: B+/-    ASSESSMENT: 25 y.o.  @ 31w5d with   1. Encounter for supervision of normal first pregnancy in third trimester         PLAN:  Encounter for supervision of normal first pregnancy in third trimester       Pain, fever, bleeding precautions  Labor, Fetal movement, and Preeclampsia precautions  Cont PNV  Return to clinic in 2 weeks

## 2025-04-03 ENCOUNTER — PATIENT MESSAGE (OUTPATIENT)
Dept: OTHER | Facility: OTHER | Age: 25
End: 2025-04-03
Payer: MEDICAID

## 2025-04-15 ENCOUNTER — ROUTINE PRENATAL (OUTPATIENT)
Dept: OBSTETRICS AND GYNECOLOGY | Facility: CLINIC | Age: 25
End: 2025-04-15
Payer: MEDICAID

## 2025-04-15 VITALS
SYSTOLIC BLOOD PRESSURE: 120 MMHG | WEIGHT: 178.13 LBS | HEART RATE: 98 BPM | BODY MASS INDEX: 30.58 KG/M2 | DIASTOLIC BLOOD PRESSURE: 63 MMHG

## 2025-04-15 DIAGNOSIS — Z34.83 ENCOUNTER FOR SUPERVISION OF NORMAL PREGNANCY IN MULTIGRAVIDA IN THIRD TRIMESTER: Primary | ICD-10-CM

## 2025-04-15 PROCEDURE — 99214 OFFICE O/P EST MOD 30 MIN: CPT | Mod: TH,S$PBB,, | Performed by: NURSE PRACTITIONER

## 2025-04-15 NOTE — PROGRESS NOTES
CC: Follow-Up OB    HPI:   25 y.o.  at 33w5d with EDC of 2025, by Ultrasound, here for her follow up OB visit.  Denies any complaints.    Pregnancy ROS:  Positive fetal movement   Negative leakage of fluid   Negative vaginal bleeding   Negative headache, vision changes, RUQ pain, epigastric pain  Negative contractions/abdominal pain   Negative pelvic pressure     Physical Exam:  Prenatal Vitals  BP: 120/63  Weight: 80.8 kg (178 lb 2 oz)  Fetal Heart Rate: 150s  Urine Glucose: Negative  Urine Albumin: Negative    Wt Readings from Last 3 Encounters:   04/15/25 80.8 kg (178 lb 2 oz)   25 79.1 kg (174 lb 6 oz)   25 77.3 kg (170 lb 8 oz)       Body mass index is 30.58 kg/m².    General: NAD, well developed, well nourished  Psych: alert and oriented to person, time and place, normal affect  HEENT: normocephalic, atraumatic  Abd: Gravid, soft, NT, ND  Skin: warm, dry  Neuro: normal gait, gross motor function intact  No cyanosis, clubbing or edema    FHTs: 150s  FH: AGA    Maternal Blood Type: B+/-    ASSESSMENT: 25 y.o.  @ 33w5d with   1. Encounter for supervision of normal pregnancy in multigravida in third trimester         PLAN:  Encounter for supervision of normal pregnancy in multigravida in third trimester  -     Urinalysis, Reflex to Urine Culture; Future; Expected date: 04/15/2025       Pain, fever, bleeding precautions  Labor, Fetal movement, and Preeclampsia precautions  Cont PNV  Return to clinic in 2 weeks tia/Robel

## 2025-04-16 ENCOUNTER — PATIENT MESSAGE (OUTPATIENT)
Dept: OBSTETRICS AND GYNECOLOGY | Facility: CLINIC | Age: 25
End: 2025-04-16
Payer: MEDICAID

## 2025-04-22 ENCOUNTER — PATIENT MESSAGE (OUTPATIENT)
Dept: OBSTETRICS AND GYNECOLOGY | Facility: CLINIC | Age: 25
End: 2025-04-22
Payer: MEDICAID

## 2025-04-22 DIAGNOSIS — L50.9 URTICARIA: Primary | ICD-10-CM

## 2025-04-22 LAB
ALBUMIN SERPL-MCNC: 3.6 G/DL (ref 3.5–5.2)
ALP ISOS SERPL LEV INH-CCNC: 108 U/L (ref 35–105)
ALT (SGPT): 14 U/L (ref 0–33)
AST SERPL-CCNC: 18 U/L (ref 0–32)
BILIRUB CONJ+UNCONJ SERPL-MCNC: ABNORMAL MG/DL (ref 0.1–0.8)
BILIRUBIN DIRECT+TOT PNL SERPL-MCNC: <0.2 MG/DL (ref 0–0.3)
BILIRUBIN, TOTAL: 0.18 MG/DL (ref 0–1.2)
GLOBULIN: 3.1 G/DL (ref 1.5–4.5)
PROT SNV-MCNC: 6.7 G/DL (ref 6.4–8.3)

## 2025-04-23 ENCOUNTER — RESULTS FOLLOW-UP (OUTPATIENT)
Dept: OBSTETRICS AND GYNECOLOGY | Facility: CLINIC | Age: 25
End: 2025-04-23

## 2025-04-24 ENCOUNTER — PATIENT MESSAGE (OUTPATIENT)
Dept: OTHER | Facility: OTHER | Age: 25
End: 2025-04-24
Payer: MEDICAID

## 2025-04-24 LAB — BILE ACIDS: 5.8 UMOL/L (ref 0–10)

## 2025-04-29 ENCOUNTER — ROUTINE PRENATAL (OUTPATIENT)
Dept: OBSTETRICS AND GYNECOLOGY | Facility: CLINIC | Age: 25
End: 2025-04-29
Payer: MEDICAID

## 2025-04-29 VITALS
SYSTOLIC BLOOD PRESSURE: 132 MMHG | DIASTOLIC BLOOD PRESSURE: 74 MMHG | HEART RATE: 74 BPM | BODY MASS INDEX: 31.07 KG/M2 | WEIGHT: 181 LBS

## 2025-04-29 DIAGNOSIS — Z34.83 ENCOUNTER FOR SUPERVISION OF NORMAL PREGNANCY IN MULTIGRAVIDA IN THIRD TRIMESTER: Primary | ICD-10-CM

## 2025-04-29 PROCEDURE — 99214 OFFICE O/P EST MOD 30 MIN: CPT | Mod: S$PBB,TH,, | Performed by: OBSTETRICS & GYNECOLOGY

## 2025-04-29 NOTE — PROGRESS NOTES
Subjective:       Patient ID: Marivel Knight is a 25 y.o.  at 35w5d     Chief Complaint:  Routine Prenatal Visit      History of Present Illness  No complaints. Reports normal sx. Labs and history reviewed with pt.         Review of Systems  Denies n/v, f/c, dysuria, contractions,   VD, VB, round ligament pain, headaches, preE ROS       Objective:     Vitals:    25 1033   BP: 132/74   Pulse: 74     Wt Readings from Last 3 Encounters:   25 82.1 kg (181 lb)   04/15/25 80.8 kg (178 lb 2 oz)   25 79.1 kg (174 lb 6 oz)       nad  NCAT  pupils normal size  Skin nml no rashes or lesions  No resp distress, resp even and unlabored  Gravid nt, no rebound no guarding  No cyanosis or clubbing, edema appropriate for pregn    FHT: 150's      Assessment:        1. Encounter for supervision of normal pregnancy in multigravida in third trimester                Plan:        Encouraged PNV  Pain, fever, bleeding precautions   RTC 1 weeks

## 2025-05-06 ENCOUNTER — ROUTINE PRENATAL (OUTPATIENT)
Dept: OBSTETRICS AND GYNECOLOGY | Facility: CLINIC | Age: 25
End: 2025-05-06
Payer: MEDICAID

## 2025-05-06 VITALS
BODY MASS INDEX: 31.58 KG/M2 | SYSTOLIC BLOOD PRESSURE: 139 MMHG | HEART RATE: 84 BPM | DIASTOLIC BLOOD PRESSURE: 85 MMHG | WEIGHT: 184 LBS

## 2025-05-06 DIAGNOSIS — Z34.83 ENCOUNTER FOR SUPERVISION OF NORMAL PREGNANCY IN MULTIGRAVIDA IN THIRD TRIMESTER: Primary | ICD-10-CM

## 2025-05-06 PROCEDURE — 99214 OFFICE O/P EST MOD 30 MIN: CPT | Mod: S$PBB,TH,, | Performed by: OBSTETRICS & GYNECOLOGY

## 2025-05-06 NOTE — PROGRESS NOTES
Subjective:       Patient ID: Marivel Knight is a 25 y.o.  at 36w5d     Chief Complaint:  Routine Prenatal Visit      History of Present Illness  No complaints. Reports normal sx. Labs and history reviewed with pt.         Review of Systems  Denies n/v, f/c, dysuria, contractions,   VD, VB, round ligament pain, headaches, preE ROS       Objective:     Vitals:    25 1005   BP: 139/85   Pulse: 84     Wt Readings from Last 3 Encounters:   25 83.5 kg (184 lb)   25 82.1 kg (181 lb)   04/15/25 80.8 kg (178 lb 2 oz)       nad  NCAT  pupils normal size  Skin nml no rashes or lesions  No resp distress, resp even and unlabored  Gravid nt, no rebound no guarding  No cyanosis or clubbing, edema appropriate for pregn    FHT: 150s'  CVX: 1 th h    Assessment:        1. Encounter for supervision of normal pregnancy in multigravida in third trimester                Plan:        Encouraged PNV  Pain, fever, bleeding precautions   RTC 1 weeks

## 2025-05-07 LAB
GROUP B STREP MOLECULAR: NEGATIVE
PENICILLIN ALLERGIC: NO

## 2025-05-13 ENCOUNTER — ROUTINE PRENATAL (OUTPATIENT)
Dept: OBSTETRICS AND GYNECOLOGY | Facility: CLINIC | Age: 25
End: 2025-05-13
Payer: MEDICAID

## 2025-05-13 VITALS
DIASTOLIC BLOOD PRESSURE: 81 MMHG | BODY MASS INDEX: 31.58 KG/M2 | WEIGHT: 184 LBS | HEART RATE: 83 BPM | SYSTOLIC BLOOD PRESSURE: 132 MMHG

## 2025-05-13 DIAGNOSIS — Z34.83 ENCOUNTER FOR SUPERVISION OF NORMAL PREGNANCY IN MULTIGRAVIDA IN THIRD TRIMESTER: Primary | ICD-10-CM

## 2025-05-13 PROCEDURE — 99214 OFFICE O/P EST MOD 30 MIN: CPT | Mod: S$PBB,TH,, | Performed by: OBSTETRICS & GYNECOLOGY

## 2025-05-13 NOTE — PROGRESS NOTES
Subjective:       Patient ID: Marivel Knight is a 25 y.o.  at 37w5d     Chief Complaint:  Routine Prenatal Visit      History of Present Illness  No complaints. Reports normal sx. Labs and history reviewed with pt.         Review of Systems  Denies n/v, f/c, dysuria, contractions,   VD, VB, round ligament pain, headaches, preE ROS       Objective:     Vitals:    25 0943   BP: 132/81   Pulse: 83     Wt Readings from Last 3 Encounters:   25 83.5 kg (184 lb)   25 83.5 kg (184 lb)   25 82.1 kg (181 lb)       nad  NCAT  pupils normal size  Skin nml no rashes or lesions  No resp distress, resp even and unlabored  Gravid nt, no rebound no guarding  No cyanosis or clubbing, edema appropriate for pregn    FHT: 150's  CVX: 2 50 h    Assessment:        1. Encounter for supervision of normal pregnancy in multigravida in third trimester                Plan:        Encouraged PNV  Pain, fever, bleeding precautions   RTC 1 weeks

## 2025-05-22 ENCOUNTER — OUTSIDE PLACE OF SERVICE (OUTPATIENT)
Dept: OBSTETRICS AND GYNECOLOGY | Facility: CLINIC | Age: 25
End: 2025-05-22